# Patient Record
Sex: MALE | Race: WHITE | NOT HISPANIC OR LATINO | Employment: FULL TIME | URBAN - METROPOLITAN AREA
[De-identification: names, ages, dates, MRNs, and addresses within clinical notes are randomized per-mention and may not be internally consistent; named-entity substitution may affect disease eponyms.]

---

## 2021-07-18 ENCOUNTER — HOSPITAL ENCOUNTER (EMERGENCY)
Facility: HOSPITAL | Age: 31
Discharge: HOME | End: 2021-07-18
Attending: EMERGENCY MEDICINE | Admitting: EMERGENCY MEDICINE
Payer: COMMERCIAL

## 2021-07-18 ENCOUNTER — APPOINTMENT (EMERGENCY)
Dept: RADIOLOGY | Facility: HOSPITAL | Age: 31
End: 2021-07-18
Payer: COMMERCIAL

## 2021-07-18 VITALS
RESPIRATION RATE: 18 BRPM | OXYGEN SATURATION: 95 % | SYSTOLIC BLOOD PRESSURE: 144 MMHG | TEMPERATURE: 98.3 F | HEART RATE: 85 BPM | DIASTOLIC BLOOD PRESSURE: 67 MMHG

## 2021-07-18 DIAGNOSIS — M54.41 ACUTE RIGHT-SIDED LOW BACK PAIN WITH RIGHT-SIDED SCIATICA: Primary | ICD-10-CM

## 2021-07-18 PROCEDURE — 99283 EMERGENCY DEPT VISIT LOW MDM: CPT

## 2021-07-18 PROCEDURE — 96372 THER/PROPH/DIAG INJ SC/IM: CPT

## 2021-07-18 PROCEDURE — 99285 EMERGENCY DEPT VISIT HI MDM: CPT | Performed by: PHYSICIAN ASSISTANT

## 2021-07-18 PROCEDURE — 72100 X-RAY EXAM L-S SPINE 2/3 VWS: CPT

## 2021-07-18 RX ORDER — METHOCARBAMOL 500 MG/1
500 TABLET, FILM COATED ORAL EVERY 12 HOURS PRN
Qty: 14 TABLET | Refills: 0 | Status: SHIPPED | OUTPATIENT
Start: 2021-07-18

## 2021-07-18 RX ORDER — DIAZEPAM 5 MG/ML
5 INJECTION, SOLUTION INTRAMUSCULAR; INTRAVENOUS ONCE
Status: COMPLETED | OUTPATIENT
Start: 2021-07-18 | End: 2021-07-18

## 2021-07-18 RX ORDER — KETOROLAC TROMETHAMINE 30 MG/ML
15 INJECTION, SOLUTION INTRAMUSCULAR; INTRAVENOUS ONCE
Status: COMPLETED | OUTPATIENT
Start: 2021-07-18 | End: 2021-07-18

## 2021-07-18 RX ORDER — KETOROLAC TROMETHAMINE 10 MG/1
10 TABLET, FILM COATED ORAL EVERY 6 HOURS PRN
Qty: 20 TABLET | Refills: 0 | Status: SHIPPED | OUTPATIENT
Start: 2021-07-18

## 2021-07-18 RX ORDER — DIAZEPAM 5 MG/1
5 TABLET ORAL EVERY 12 HOURS PRN
Qty: 10 TABLET | Refills: 0 | Status: SHIPPED | OUTPATIENT
Start: 2021-07-18

## 2021-07-18 RX ADMIN — KETOROLAC TROMETHAMINE 15 MG: 30 INJECTION, SOLUTION INTRAMUSCULAR; INTRAVENOUS at 20:24

## 2021-07-18 RX ADMIN — DIAZEPAM 5 MG: 10 INJECTION, SOLUTION INTRAMUSCULAR; INTRAVENOUS at 20:25

## 2021-07-18 NOTE — Clinical Note
Pritesh Krause was seen and treated in our emergency department on 7/18/2021  No work until cleared by Family Doctor/Orthopedics        Diagnosis:     Marie Matthew    He may return on this date: If you have any questions or concerns, please don't hesitate to call        Royal Mira PA-C    ______________________________           _______________          _______________  Hospital Representative                              Date                                Time

## 2021-07-19 NOTE — ED PROVIDER NOTES
History  Chief Complaint   Patient presents with    Back Pain     patinet states he bent over to  pants and felt a "pop"  hx of DJD  states, "this has happened before and i usually try to walk it off but it hurts"  Patient is a 78-year-old male with history of hypertension, depression and obesity, presents emergency department via EMS for evaluation of back pain  Patient states prior to arrival he bent over to  his pants, felt a pop and began with sudden onset of lower back pain  Patient localizes pain to right lower back, denies any radiation of pain  Patient with history of degenerative disc changes, had a similar episode in the past where he needed to have physical therapy which improved his back pain  Patient did not take any medication prior to arrival   Patient states pain worse with movement  Patient without fever, IVDA, bladder/bowel incontinence, urinary retention, perianal numbness, radiation of pain down legs  Prior to Admission Medications   Prescriptions Last Dose Informant Patient Reported? Taking?   lisinopril-hydrochlorothiazide (PRINZIDE,ZESTORETIC) 20-12 5 MG per tablet   Yes No   Sig: Take 1 tablet by mouth daily  naproxen (NAPROSYN) 500 mg tablet   No No   Si tab PO q12 hours PRN Pain   sertraline (ZOLOFT) 100 mg tablet   Yes No   Sig: Take 100 mg by mouth daily  Facility-Administered Medications: None       Past Medical History:   Diagnosis Date    Depression     Hypertension        History reviewed  No pertinent surgical history  History reviewed  No pertinent family history  I have reviewed and agree with the history as documented  E-Cigarette/Vaping     E-Cigarette/Vaping Substances     Social History     Tobacco Use    Smoking status: Never Smoker    Smokeless tobacco: Never Used   Substance Use Topics    Alcohol use: No     Comment: socially    Drug use: No       Review of Systems   Musculoskeletal: Positive for back pain     All other systems reviewed and are negative  Physical Exam  Physical Exam  Constitutional:       Appearance: Normal appearance  HENT:      Head: Normocephalic and atraumatic  Right Ear: External ear normal       Left Ear: External ear normal       Nose: Nose normal       Mouth/Throat:      Lips: Pink  Mouth: Mucous membranes are moist    Eyes:      Extraocular Movements: Extraocular movements intact  Conjunctiva/sclera: Conjunctivae normal    Pulmonary:      Effort: No tachypnea or respiratory distress  Musculoskeletal:      Cervical back: Normal range of motion and neck supple  Lumbar back: Spasms and tenderness present  Decreased range of motion  Back:    Skin:     General: Skin is warm  Capillary Refill: Capillary refill takes less than 2 seconds  Neurological:      Mental Status: He is alert and oriented to person, place, and time  GCS: GCS eye subscore is 4  GCS verbal subscore is 5  GCS motor subscore is 6  Sensory: Sensation is intact  Motor: Motor function is intact  Deep Tendon Reflexes: Reflexes are normal and symmetric  Reflex Scores:       Patellar reflexes are 2+ on the left side    Psychiatric:         Mood and Affect: Mood and affect normal          Speech: Speech normal          Vital Signs  ED Triage Vitals [07/18/21 1903]   Temperature Pulse Respirations Blood Pressure SpO2   98 3 °F (36 8 °C) 88 18 (!) 149/103 95 %      Temp Source Heart Rate Source Patient Position - Orthostatic VS BP Location FiO2 (%)   Oral Monitor Sitting Right arm --      Pain Score       9           Vitals:    07/18/21 1903 07/18/21 2107   BP: (!) 149/103 144/67   Pulse: 88 85   Patient Position - Orthostatic VS: Sitting Sitting         Visual Acuity      ED Medications  Medications   ketorolac (TORADOL) injection 15 mg (15 mg Intramuscular Given 7/18/21 2024)   diazepam (VALIUM) injection 5 mg (5 mg Intramuscular Given 7/18/21 2025)       Diagnostic Studies  Results Reviewed     None                 XR lumbar spine 2 or 3 views   Final Result by Christa Hollins MD (07/19 1000)      No acute osseous of normality  Straightening of cervical lordosis  No subluxation  No compression deformity  Mild lower lumbar facet spondylosis  Workstation performed: [id number]                    Procedures  Procedures         ED Course                                           MDM  Number of Diagnoses or Management Options  Acute right-sided low back pain with right-sided sciatica: new and requires workup  Diagnosis management comments: Patient is a 50-year-old male with history of hypertension, depression and obesity, presents emergency department via EMS for evaluation of back pain  Patient states prior to arrival he bent over to  his pants, felt a pop and began with sudden onset of lower back pain  X-ray lumbar spine shows: No acute osseous of normality  Straightening of cervical lordosis  No subluxation  No compression deformity  Mild lower lumbar facet spondylosis  Toradol and Valium given in emergency department with improvement in pain  Rx for short course of Valium, Robaxin and Toradol provided to patient for symptomatic relief  There was no evidence to support genitourinary etiology  No fevers or other evidence to suspect infectious processes, abscess, osteomyelitis etc  The patient's neurological exam is normal with normal motor and sensory  There is no saddle paresthesias reported and no bowel or bladder incontinence or urinary retention  I suspect the pain is mechanical in nature  Clinical suspicion, plan of care and management was discussed with the patient  The patient was instructed to follow up with their health care provider and the comprehensive spine program  The patient was also instructed to return if the pain worsened, changed, or developed weakness or bowel or bladder trouble   The patient agreed with plan         Disposition  Final diagnoses:   Acute right-sided low back pain with right-sided sciatica     Time reflects when diagnosis was documented in both MDM as applicable and the Disposition within this note     Time User Action Codes Description Comment    7/18/2021  9:36 PM Jamarcus Duque Add [M54 41] Acute right-sided low back pain with right-sided sciatica       ED Disposition     ED Disposition Condition Date/Time Comment    Discharge Stable Sun Jul 18, 2021  9:36 PM Emaline Bracket discharge to home/self care  Follow-up Information     Follow up With Specialties Details Why Contact Info Additional [address] [facility] Schedule an appointment as soon as possible for a visit   [address] [facility]  [place] [address] [facility] [address] [address] [facility], [address] [facility], [address] [place], [place], [address] [phone]          Discharge Medication List as of 7/18/2021  9:38 PM      START taking these medications    Details   diazepam (VALIUM) 5 mg tablet Take 1 tablet (5 mg total) by mouth every 12 (twelve) hours as needed for muscle spasms, Starting Sun 7/18/2021, Normal      ketorolac (TORADOL) 10 mg tablet Take 1 tablet (10 mg total) by mouth every 6 (six) hours as needed for moderate pain, Starting Sun 7/18/2021, Normal      methocarbamol (ROBAXIN) 500 mg tablet Take 1 tablet (500 mg total) by mouth every 12 (twelve) hours as needed for muscle spasms, Starting Sun 7/18/2021, Normal         CONTINUE these medications which have NOT CHANGED    Details   lisinopril-hydrochlorothiazide (PRINZIDE,ZESTORETIC) 20-12 5 MG per tablet Take 1 tablet by mouth daily  , Until Discontinued, Historical Med      naproxen (NAPROSYN) 500 mg tablet 1 tab PO q12 hours PRN Pain, Print      sertraline (ZOLOFT) 100 mg tablet Take 100 mg by mouth daily  , Until Discontinued, Historical Med           No discharge procedures on file      PDMP Review     None          ED Provider  Electronically Signed by           Kaiden Gautam PA-C  07/21/21 0640